# Patient Record
Sex: FEMALE | Race: WHITE
[De-identification: names, ages, dates, MRNs, and addresses within clinical notes are randomized per-mention and may not be internally consistent; named-entity substitution may affect disease eponyms.]

---

## 2018-07-29 ENCOUNTER — HOSPITAL ENCOUNTER (OUTPATIENT)
Dept: HOSPITAL 33 - ED | Age: 63
Setting detail: OBSERVATION
LOS: 1 days | Discharge: HOME | End: 2018-07-30
Attending: INTERNAL MEDICINE | Admitting: INTERNAL MEDICINE
Payer: MEDICARE

## 2018-07-29 DIAGNOSIS — J44.9: ICD-10-CM

## 2018-07-29 DIAGNOSIS — M19.90: ICD-10-CM

## 2018-07-29 DIAGNOSIS — Z79.899: ICD-10-CM

## 2018-07-29 DIAGNOSIS — E78.2: ICD-10-CM

## 2018-07-29 DIAGNOSIS — G45.9: Primary | ICD-10-CM

## 2018-07-29 DIAGNOSIS — K21.9: ICD-10-CM

## 2018-07-29 DIAGNOSIS — I10: ICD-10-CM

## 2018-07-29 DIAGNOSIS — J45.909: ICD-10-CM

## 2018-07-29 DIAGNOSIS — E78.00: ICD-10-CM

## 2018-07-29 LAB
ALBUMIN SERPL-MCNC: 3.9 G/DL (ref 3.5–5)
ALP SERPL-CCNC: 164 U/L (ref 38–126)
ALT SERPL-CCNC: 13 U/L (ref 0–35)
ANION GAP SERPL CALC-SCNC: 12.9 MEQ/L (ref 5–15)
AST SERPL QL: 18 U/L (ref 14–36)
BASOPHILS # BLD AUTO: 0.01 10*3/UL (ref 0–0.4)
BASOPHILS NFR BLD AUTO: 0.1 % (ref 0–0.4)
BILIRUB BLD-MCNC: 0.5 MG/DL (ref 0.2–1.3)
BUN SERPL-MCNC: 16 MG/DL (ref 7–17)
CALCIUM SPEC-MCNC: 9.1 MG/DL (ref 8.4–10.2)
CHLORIDE SERPL-SCNC: 97 MMOL/L (ref 98–107)
CO2 SERPL-SCNC: 27 MMOL/L (ref 22–30)
CREAT SERPL-MCNC: 1.19 MG/DL (ref 0.52–1.04)
EOSINOPHIL # BLD AUTO: 0.26 10*3/UL (ref 0–0.5)
GLUCOSE SERPL-MCNC: 110 MG/DL (ref 74–106)
GRANULOCYTES # BLD AUTO: 3.78 10*3/UL (ref 1.4–6.9)
HCT VFR BLD AUTO: 31.5 % (ref 35–47)
HGB BLD-MCNC: 10.4 GM/DL (ref 12–16)
INR PPP: 0.99 (ref 0.8–3)
LYMPHOCYTES # SPEC AUTO: 2.43 10*3/UL (ref 1–4.6)
MCH RBC QN AUTO: 28.7 PG (ref 26–32)
MCHC RBC AUTO-ENTMCNC: 33 G/DL (ref 32–36)
MONOCYTES # BLD AUTO: 0.53 10*3/UL (ref 0–1.3)
NEUTROPHILS NFR BLD AUTO: 53.9 % (ref 36–66)
PLATELET # BLD AUTO: 254 K/MM3 (ref 150–450)
POTASSIUM SERPLBLD-SCNC: 4 MMOL/L (ref 3.5–5.1)
PROT SERPL-MCNC: 6.5 G/DL (ref 6.3–8.2)
RBC # BLD AUTO: 3.62 M/MM3 (ref 4.1–5.4)
SODIUM SERPL-SCNC: 133 MMOL/L (ref 137–145)
WBC # BLD AUTO: 7 K/MM3 (ref 4–10.5)

## 2018-07-29 PROCEDURE — 93880 EXTRACRANIAL BILAT STUDY: CPT

## 2018-07-29 PROCEDURE — 70551 MRI BRAIN STEM W/O DYE: CPT

## 2018-07-29 PROCEDURE — G0378 HOSPITAL OBSERVATION PER HR: HCPCS

## 2018-07-29 PROCEDURE — 84484 ASSAY OF TROPONIN QUANT: CPT

## 2018-07-29 PROCEDURE — 80053 COMPREHEN METABOLIC PANEL: CPT

## 2018-07-29 PROCEDURE — 36415 COLL VENOUS BLD VENIPUNCTURE: CPT

## 2018-07-29 PROCEDURE — 99285 EMERGENCY DEPT VISIT HI MDM: CPT

## 2018-07-29 PROCEDURE — 94760 N-INVAS EAR/PLS OXIMETRY 1: CPT

## 2018-07-29 PROCEDURE — 71045 X-RAY EXAM CHEST 1 VIEW: CPT

## 2018-07-29 PROCEDURE — 93306 TTE W/DOPPLER COMPLETE: CPT

## 2018-07-29 PROCEDURE — 85610 PROTHROMBIN TIME: CPT

## 2018-07-29 PROCEDURE — 93005 ELECTROCARDIOGRAM TRACING: CPT

## 2018-07-29 PROCEDURE — 85025 COMPLETE CBC W/AUTO DIFF WBC: CPT

## 2018-07-29 PROCEDURE — 36000 PLACE NEEDLE IN VEIN: CPT

## 2018-07-29 PROCEDURE — 70450 CT HEAD/BRAIN W/O DYE: CPT

## 2018-07-29 RX ADMIN — GABAPENTIN SCH MG: 300 CAPSULE ORAL at 22:12

## 2018-07-29 RX ADMIN — RANOLAZINE SCH MG: 500 TABLET, FILM COATED, EXTENDED RELEASE ORAL at 23:57

## 2018-07-29 NOTE — XRAY
Indication: Dizziness.



Multiple contiguous axial images obtained through the head without contrast.



Comparison: None



Normal appearing brain parenchyma, ventricles, and bony calvarium.  Visualized

paranasal sinuses and mastoid air cells are clear.



Impression: Normal CT head without contrast exam.



Comment: Preliminary interpretation was made by [facility].  No discrepancy.



CTDI 67.80

## 2018-07-29 NOTE — XRAY
Indication: Cough.



Comparison: None



Portable apical lordotic chest demonstrates right hemidiaphragm elevation with

adjacent infiltrate/atelectasis.  Remaining lungs clear.  Heart is not

enlarged.  Bony thorax intact with mild spinal degenerative changes.



Impression: Right hemidiaphragm elevation with adjacent

infiltrate/atelectasis.  Correlate clinically.

## 2018-07-29 NOTE — ERPHSYRPT
- History of Present Illness


Time Seen by Provider: 07/29/18 14:30


Source: patient


Exam Limitations: clinical condition


Patient Subjective Stated Complaint: dizziness while visiting at the long-term.  

states has had a hx of dizziness x 1 year.. did not eat tioday.


Triage Nursing Assessment: alert and oriented with no dizziness at this time.. 

has had episodes of dizziness x 1 year.  states has had visual problems 

intermittently x 1 year.  MORRIS  able to ambulate to BR on arrival to ER with no 

difficulty and steady gait.   = strong.


Physician History: 





PATIENT WITH A HISTORY OF HYPERTENSION COMPLAINS OF ACUTE ONSET OF DYSPHASIA 

AND DYSARTHRIA WHILE VISITING FAMILY MEMBER, DURATION OF 20 MINUTES.  HAD 

PREVIOUS EPISODE IN THE PAST, EVALUATED BY NEUROLOGIST.  STATES SYMPTOMS 

RESOLVED PRIOR TO ARRIVAL. DENIES HEADACHE, BLURRED VISION,  FOCAL NUMBNESS, 

TINGLING OR WEAKNESS IN EXTREMITIES.


Timing/Duration: today (20 MINUTES)


Severity: moderate


Character of Deficits: none


Deficits: no difficulties


Baseline/Normal Cognition: alert oriented x 3


Current Cognition: alert oriented x 3


Baseline Gait: walks w/o assistance


Associated Symptoms: slurred speech


Allergies/Adverse Reactions: 








Penicillins Allergy (Verified 07/29/18 15:13)


 


sulfamethoxazole [From Bactrim] Allergy (Verified 07/29/18 15:14)


 


trimethoprim [From Bactrim] Allergy (Verified 07/29/18 15:14)


 


codeine Adverse Reaction (Verified 07/29/18 15:14)


 


ibuprofen Adverse Reaction (Verified 07/29/18 15:14)


 





Home Medications: 








Atorvastatin Calcium [Lipitor] 40 mg PO DAILY 07/29/18 [History]


Cyclobenzaprine HCl [Flexeril] 10 mg PO TIDPRN PRN 07/29/18 [History]


Gabapentin [Neurontin] 600 mg PO TID 07/29/18 [History]


Lisinopril/Hydrochlorothiazide [Lisinopril-Hctz 20-12.5 mg Tab] 1 each PO DAILY 

07/29/18 [History]


Metformin HCl [Metformin HCl ER] 500 mg PO DAILY 07/29/18 [History]


Nebivolol HCl 5 MG*** [Bystolic 5 MG***] 5 mg PO DAILY 07/29/18 [History]


PANTOPRAZOLE 40 mg Tablet*** [Protonix 40MG Tablet***] 40 mg PO QAM 07/29/18 [

History]


Ranolazine 500 MG*** [Ranexa 500 MG***] 500 mg PO BID 07/29/18 [History]


Tramadol HCl 50 mg*** [Ultram 50 mg***] 100 mg PO Q8H PRN PRN 07/29/18 [History]





Immunizations Up to Date:  (unknown)





- Review of Systems


Constitutional: No Fever, No Chills


Eyes: No Symptoms


Ears, Nose, & Throat: No Symptoms


Respiratory: No Cough, No Dyspnea


Cardiac: No Chest Pain, No Edema, No Syncope


Abdominal/Gastrointestinal: No Abdominal Pain, No Nausea, No Vomiting, No 

Diarrhea


Genitourinary Symptoms: No Dysuria


Musculoskeletal: No Symptoms, No Back Pain, No Neck Pain


Skin: No Rash


Neurological: Speech Changes, No Dizziness, No Focal Weakness, No Sensory 

Changes


Psychological: No Symptoms


Endocrine: No Symptoms


All Other Systems: Reviewed and Negative





- Past Medical History


Pertinent Past Medical History: Yes


Cardiac History: High Cholesterol, Hypertension


Respiratory History: Asthma, COPD


Musculoskeletal History: Osteoarthritis


GI Medical History: GERD


Other Medical History: fibromyalgia





- Past Surgical History


Past Surgical History: Yes


Other Surgical History: states has had20 but unsure of exactly what





- Social History


Smoking Status: Former smoker


Exposure to second hand smoke: No


Drug Use: none


Patient Lives Alone: No





- Female History


Hx Pregnant Now: No





- Nursing Vital Signs


Nursing Vital Signs: 





 Initial Vital Signs











Temperature  97.8 F   07/29/18 14:22


 


Pulse Rate  47 L  07/29/18 14:22


 


Respiratory Rate  18   07/29/18 14:22


 


Blood Pressure  144/72   07/29/18 14:22


 


O2 Sat by Pulse Oximetry  98   07/29/18 14:22








 Pain Scale











Pain Intensity                 0

















- [place] Coma Scale


Best Eye Response ([place]): (4) open spontaneously


Best Verbal Response ([place]): (5) oriented


Best Motor Response ([place]): (6) obeys commands


[place] Total: 15





- Physical Exam


General Appearance: no apparent distress, alert


Eye Exam: bilateral eye: normal inspection, PERRL, EOMI


Ears, Nose, Throat Exam: normal ENT inspection, moist mucous membranes


Neck Exam: normal inspection, non-tender, supple


Respiratory: normal breath sounds, lungs clear, airway intact, No respiratory 

distress


Cardiovascular: regular rate/rhythm, normal heart sounds, No edema


Gastrointestinal: soft, No tenderness, No distention


Back Exam: normal inspection


Extremity Exam: normal inspection, No pedal edema


Peripheral Pulses: carotid (R): 2+, carotid (L): 2+, femoral (R): 2+, femoral (L

): 2+, dorsalis-pedis (R): 2+, dorsalis-pedis (L): 2+


Mental Status: alert, oriented x 3


CNs Exam: normal hearing, normal speech, tongue midline


Coordination/Gait: normal finger to nose, normal gait


DTR: bicep (R): 2+, bicep (L): 2+, tricep (R): 2+, tricep (L): 2+, knee (R): 2+

, knee (L): 2+, ankle (R): 2+, ankle (L): 2+


Skin Exam: normal color, warm, dry, No rash


SpO2: 100


Oxygen Delivery: Nasal Cannula





- Course


EKG Interpreted by Me: RATE, Sinus Rhythm, Sinus Ankush





- Radiology Exams


  ** Chest


X-ray Interpretation: Interpreted by me, Reviewed by me (ELEVATION RIGHT 

HEMIDIAPHRAM)





- CT Exams


  ** Head


CT Interpretation: No/Intracranial Hemorrhag


Ordered Tests: 





 Active Orders 24 hr











 Category Date Time Status


 


 EKG-ER Only STAT Care  07/29/18 15:02 Active


 


 IV Insertion STAT Care  07/29/18 15:02 Active


 


 Oxygen-ED Only NASAL CANNULA 2 lpm Care  07/29/18 15:02 Active


 


 CHEST 1 VIEW (PORTABLE) Stat Exams  07/29/18 16:02 Taken


 


 HEAD WITHOUT CONTRAST [CT] Stat Exams  07/29/18 15:04 Taken


 


 CBC W DIFF Stat Lab  07/29/18 14:30 Completed


 


 CMP Stat Lab  07/29/18 14:30 Completed


 


 PROTIME WITH INR Stat Lab  07/29/18 14:30 Completed


 


 TROPONIN Q3H Lab  07/29/18 14:30 Completed


 


 TROPONIN Q3H Lab  07/30/18 00:15 Ordered


 


 TROPONIN Q3H Lab  07/30/18 03:15 Ordered








Medication Summary











Generic Name Dose Route Start Last Admin





  Trade Name Freq  PRN Reason Stop Dose Admin


 


Sodium Chloride  1,000 mls @ 50 mls/hr  07/29/18 15:15  07/29/18 15:22





  Sodium Chloride 0.9% 1000 Ml  IV  08/28/18 15:14  50 mls/hr





  .Q20H DARÍO   Administration





     





     





     





     











Lab/Rad Data: 





 Laboratory Result Diagrams





 07/29/18 14:30 





 07/29/18 14:30 





 Laboratory Results











  07/29/18 07/29/18 07/29/18 Range/Units





  14:30 14:30 14:30 


 


WBC     (4.0-10.5)  K/mm3


 


RBC     (4.1-5.4)  M/mm3


 


Hgb     (12.0-16.0)  gm/dl


 


Hct     (35-47)  %


 


MCV     ()  fl


 


MCH     (26-32)  pg


 


MCHC     (32-36)  g/dl


 


RDW     (11.5-14.0)  %


 


Plt Count     (150-450)  K/mm3


 


MPV     (6-9.5)  fl


 


Gran %     (36.0-66.0)  %


 


Eos # (Auto)     (0-0.5)  


 


Absolute Lymphs (auto)     (1.0-4.6)  


 


Absolute Monos (auto)     (0.0-1.3)  


 


Lymphocytes %     (24.0-44.0)  %


 


Monocytes %     (0.0-12.0)  %


 


Eosinophils %     (0.00-5.0)  %


 


Basophils %     (0.0-0.4)  %


 


Absolute Granulocytes     (1.4-6.9)  


 


Basophils #     (0-0.4)  


 


PT   11.5   (9.95-12.35)  SECONDS


 


INR   0.99   (0.8-3.0)  


 


Sodium    133 L  (137-145)  mmol/L


 


Potassium    4.0  (3.5-5.1)  mmol/L


 


Chloride    97 L  ()  mmol/L


 


Carbon Dioxide    27  (22-30)  mmol/L


 


Anion Gap    12.9  (5-15)  MEQ/L


 


BUN    16  (7-17)  mg/dL


 


Creatinine    1.19 H  (0.52-1.04)  mg/dL


 


Estimated GFR    48.9  ML/MIN


 


Glucose    110 H  ()  mg/dL


 


Calcium    9.1  (8.4-10.2)  mg/dL


 


Total Bilirubin    0.50  (0.2-1.3)  mg/dL


 


AST    18  (14-36)  U/L


 


ALT    13  (0-35)  U/L


 


Alkaline Phosphatase    164 H  ()  U/L


 


Troponin I  < 0.012    (0.000-0.034)  ng/mL


 


Serum Total Protein    6.5  (6.3-8.2)  g/dL


 


Albumin    3.9  (3.5-5.0)  g/dL














  07/29/18 Range/Units





  14:30 


 


WBC  7.0  (4.0-10.5)  K/mm3


 


RBC  3.62 L  (4.1-5.4)  M/mm3


 


Hgb  10.4 L  (12.0-16.0)  gm/dl


 


Hct  31.5 L  (35-47)  %


 


MCV  87.0  ()  fl


 


MCH  28.7  (26-32)  pg


 


MCHC  33.0  (32-36)  g/dl


 


RDW  14.7 H  (11.5-14.0)  %


 


Plt Count  254  (150-450)  K/mm3


 


MPV  10.8 H  (6-9.5)  fl


 


Gran %  53.9  (36.0-66.0)  %


 


Eos # (Auto)  0.26  (0-0.5)  


 


Absolute Lymphs (auto)  2.43  (1.0-4.6)  


 


Absolute Monos (auto)  0.53  (0.0-1.3)  


 


Lymphocytes %  34.7  (24.0-44.0)  %


 


Monocytes %  7.6  (0.0-12.0)  %


 


Eosinophils %  3.7  (0.00-5.0)  %


 


Basophils %  0.1  (0.0-0.4)  %


 


Absolute Granulocytes  3.78  (1.4-6.9)  


 


Basophils #  0.01  (0-0.4)  


 


PT   (9.95-12.35)  SECONDS


 


INR   (0.8-3.0)  


 


Sodium   (137-145)  mmol/L


 


Potassium   (3.5-5.1)  mmol/L


 


Chloride   ()  mmol/L


 


Carbon Dioxide   (22-30)  mmol/L


 


Anion Gap   (5-15)  MEQ/L


 


BUN   (7-17)  mg/dL


 


Creatinine   (0.52-1.04)  mg/dL


 


Estimated GFR   ML/MIN


 


Glucose   ()  mg/dL


 


Calcium   (8.4-10.2)  mg/dL


 


Total Bilirubin   (0.2-1.3)  mg/dL


 


AST   (14-36)  U/L


 


ALT   (0-35)  U/L


 


Alkaline Phosphatase   ()  U/L


 


Troponin I   (0.000-0.034)  ng/mL


 


Serum Total Protein   (6.3-8.2)  g/dL


 


Albumin   (3.5-5.0)  g/dL














- Progress


Progress: unchanged


Discussed with Dr.: MURALI Coombs (DISCUSSED WITH DR ROMMEL BOX AT [id number] FOR 

OBSERVATIION)





- Departure


Time of Disposition: 18:55


Departure Disposition: Observation


Clinical Impression: 


 TRANSIENT ISCHEMIC ATTACK





Condition: Stable


Critical Care Time: No


Referrals: 


Provider,Unknown [Primary Care Provider] -

## 2018-07-30 VITALS — DIASTOLIC BLOOD PRESSURE: 63 MMHG | OXYGEN SATURATION: 94 % | SYSTOLIC BLOOD PRESSURE: 144 MMHG | HEART RATE: 53 BPM

## 2018-07-30 RX ADMIN — GABAPENTIN SCH MG: 300 CAPSULE ORAL at 09:34

## 2018-07-30 RX ADMIN — RANOLAZINE SCH MG: 500 TABLET, FILM COATED, EXTENDED RELEASE ORAL at 09:34

## 2018-07-30 NOTE — PCM.SSS
History of Present Illness





- Chief Complaint


Chief Complaint: c/o dizziness and difficulty in speech for 1 days


History of Present Illness: 


 is a 63 year old female.came to ER with c/o dizziness and difficulty in 

speech in AM. She has not eaten anything today AM and she thought thats  what 

bring this episode.








- Review of Systems


Constitutional: No Fever, No Chills


Eyes: No Symptoms


Ears, Nose, & Throat: No Symptoms


Respiratory: No Cough, No Short Of Breath


Cardiac: No Chest Pain, No Edema, No Syncope


Abdominal/Gastrointestinal: No Abdominal Pain, No Nausea, No Vomiting, No 

Diarrhea


Genitourinary Symptoms: No Dysuria


Musculoskeletal: No Back Pain, No Neck Pain


Skin: No Rash


Neurological: No Dizziness, No Focal Weakness, No Sensory Changes


Psychological: No Symptoms


Endocrine: No Symptoms


Hematologic/Lymphatic: No Symptoms


Immunological/Allergic: No Symptoms





Medications & Allergies


Home Medications: 


 Home Medication List





Atorvastatin Calcium [Lipitor] 40 mg PO QHS 07/29/18 [History Confirmed 07/29/18

]


Cholecalciferol (Vitamin D3) [Vitamin D3] 2,000 unit PO QHS 07/29/18 [History 

Confirmed 07/29/18]


Cyclobenzaprine HCl [Flexeril] 10 mg PO TIDPRN PRN 07/29/18 [History Confirmed 

07/29/18]


Fish Oil/Borage/Flax/Om3,6,9 1 [Flaxseed-Fish-Borage Oil Sftgl] 400 mg PO QHS 07 /29/18 [History Confirmed 07/29/18]


Gabapentin [Neurontin] 600 mg PO TID 07/29/18 [History Confirmed 07/29/18]


Lisinopril/Hydrochlorothiazide [Lisinopril-Hctz 20-12.5 mg Tab] 1 each PO DAILY 

07/29/18 [History Confirmed 07/29/18]


Naproxen Sodium 220 mg*** [Aleve 220 MG***] 440 mg PO QHS PRN 07/29/18 [History 

Confirmed 07/29/18]


Nebivolol HCl 5 MG*** [Bystolic 5 MG***] 5 mg PO DAILY 07/29/18 [History 

Confirmed 07/29/18]


PANTOPRAZOLE 40 mg Tablet*** [Protonix 40MG Tablet***] 40 mg PO QAM 07/29/18 [

History Confirmed 07/29/18]


Ranolazine 500 MG*** [Ranexa 500 MG***] 1,000 mg PO BID 07/29/18 [History 

Confirmed 07/29/18]


Tramadol HCl 50 mg*** [Ultram 50 mg***] 100 mg PO Q8H PRN PRN 07/29/18 [History 

Confirmed 07/29/18]








Allergies/Adverse Reactions: 


 Allergies











Allergy/AdvReac Type Severity Reaction Status Date / Time


 


sulfamethoxazole Allergy   Verified 07/29/18 15:14





[From Bactrim]     


 


trimethoprim [From Bactrim] Allergy   Verified 07/29/18 15:14


 


codeine AdvReac   Verified 07/29/18 15:14


 


ibuprofen AdvReac   Verified 07/29/18 15:14


 


Penicillins AdvReac   Verified 07/29/18 20:21














- Past Medical History


Past Medical History: Yes


Neurological History: No Pertinent History


ENT History: No Pertinent History


Cardiac History: High Cholesterol, Hypertension


Respiratory History: Asthma, COPD


Endocrine Medical History: No Pertinent History


Musculoskelatal History: Fibromyalgia, Osteoarthritis


GI Medical History: GERD


 History: No Pertinent History


Pyscho-Social History: No Pertinent History


Reproductive Disorders: No Pertinent History


Comment: fibromyalgia





- Female History


Are you pregnant now?: No





- Past Surgical History


Past Surgical History: Yes


Neuro Surgical History: No Pertinent History


Cardiac History: No Pertinent History


Respiratory Surgery: No Pertinent History


GI Surgical History: Appendectomy, Cholecystectomy


Genitourinary Surgical Hx: No Pertinent History


Musculskeletal Surgical Hx: Orthopedic Surgery


Female Surgical History: Tubal Ligation


Other Surgical History: states has had20 but unsure of exactly what





- Social History


Smoking Status: Former smoker


Exposure to second hand smoke: No


Alcohol: None


Drug Use: none





- Physical Exam


Vital Signs: 


 Vital Signs - 24 hr











  Temp Pulse Resp BP Pulse Ox


 


 07/30/18 11:25  98.6 F  53 L  18  144/63  94 L


 


 07/30/18 10:20      97


 


 07/30/18 07:21  98.8 F  49 L  18  110/49  94 L


 


 07/30/18 04:07  98.1 F  46 L  18  121/59  100


 


 07/30/18 04:00    18  


 


 07/30/18 00:03  98.2 F  54 L  22  150/63  99


 


 07/30/18 00:00    22  


 


 07/29/18 21:40   56 L  20   99


 


 07/29/18 20:29  98.3 F  59 L  16  189/79  100


 


 07/29/18 18:48      100


 


 07/29/18 18:43   48 L  18  164/58  97


 


 07/29/18 17:40   50 L  18  157/77  97


 


 07/29/18 16:24   52 L  18  148/58  100


 


 07/29/18 15:10   50 L  16  157/72  97


 


 07/29/18 15:08   53 L  18  154/57  97


 


 07/29/18 14:22  97.8 F  47 L  18  144/72  98








 Oxygen-Last 24 hours











O2 Percentage                  2 Liters = 28%


 


O2 Percentage                  2 Liters = 28%


 


O2 Percentage                  2 Liters = 28%


 


O2 Percentage                  2 Liters = 28%


 


Oxygen Flowrate (L/min)-RT     2














General Appearance: no apparent distress, alert


Neurologic Exam: alert, oriented x 3, cooperative, normal mood/affect, nml 

cerebellar function, nml station & gait, sensation nml, No motor deficits


Eye Exam: PERRL/EOMI, eyes nml inspection


Ears, Nose, Throat Exam: normal ENT inspection, TMs normal, pharynx normal, 

moist mucous membranes


Neck Exam: normal inspection, non-tender, supple, full range of motion


Respiratory Exam: normal breath sounds, lungs clear, No respiratory distress


Cardiovascular Exam: regular rate/rhythm, normal heart sounds, normal 

peripheral pulses


Gastrointestinal/Abdomen Exam: soft, normal bowel sounds, No tenderness, No mass


Back Exam: normal inspection, normal range of motion, No CVA tenderness, No 

vertebral tenderness


Extremity Exam: normal inspection, normal range of motion, pelvis stable


Skin Exam: normal color, warm, dry, No rash


Lymphatic Exam: No adenopathy





Results





- Labs


Lab/Micro Results: 


 Lab Results-Last 24 Hours











  07/29/18 07/29/18 07/29/18 Range/Units





  14:30 14:30 14:30 


 


WBC  7.0    (4.0-10.5)  K/mm3


 


RBC  3.62 L    (4.1-5.4)  M/mm3


 


Hgb  10.4 L    (12.0-16.0)  gm/dl


 


Hct  31.5 L    (35-47)  %


 


MCV  87.0    ()  fl


 


MCH  28.7    (26-32)  pg


 


MCHC  33.0    (32-36)  g/dl


 


RDW  14.7 H    (11.5-14.0)  %


 


Plt Count  254    (150-450)  K/mm3


 


MPV  10.8 H    (6-9.5)  fl


 


Gran %  53.9    (36.0-66.0)  %


 


Eos # (Auto)  0.26    (0-0.5)  


 


Absolute Lymphs (auto)  2.43    (1.0-4.6)  


 


Absolute Monos (auto)  0.53    (0.0-1.3)  


 


Lymphocytes %  34.7    (24.0-44.0)  %


 


Monocytes %  7.6    (0.0-12.0)  %


 


Eosinophils %  3.7    (0.00-5.0)  %


 


Basophils %  0.1    (0.0-0.4)  %


 


Absolute Granulocytes  3.78    (1.4-6.9)  


 


Basophils #  0.01    (0-0.4)  


 


PT    11.5  (9.95-12.35)  SECONDS


 


INR    0.99  (0.8-3.0)  


 


Sodium   133 L   (137-145)  mmol/L


 


Potassium   4.0   (3.5-5.1)  mmol/L


 


Chloride   97 L   ()  mmol/L


 


Carbon Dioxide   27   (22-30)  mmol/L


 


Anion Gap   12.9   (5-15)  MEQ/L


 


BUN   16   (7-17)  mg/dL


 


Creatinine   1.19 H   (0.52-1.04)  mg/dL


 


Estimated GFR   48.9   ML/MIN


 


Glucose   110 H   ()  mg/dL


 


Calcium   9.1   (8.4-10.2)  mg/dL


 


Total Bilirubin   0.50   (0.2-1.3)  mg/dL


 


AST   18   (14-36)  U/L


 


ALT   13   (0-35)  U/L


 


Alkaline Phosphatase   164 H   ()  U/L


 


Troponin I     (0.000-0.034)  ng/mL


 


Serum Total Protein   6.5   (6.3-8.2)  g/dL


 


Albumin   3.9   (3.5-5.0)  g/dL














  07/29/18 Range/Units





  14:30 


 


WBC   (4.0-10.5)  K/mm3


 


RBC   (4.1-5.4)  M/mm3


 


Hgb   (12.0-16.0)  gm/dl


 


Hct   (35-47)  %


 


MCV   ()  fl


 


MCH   (26-32)  pg


 


MCHC   (32-36)  g/dl


 


RDW   (11.5-14.0)  %


 


Plt Count   (150-450)  K/mm3


 


MPV   (6-9.5)  fl


 


Gran %   (36.0-66.0)  %


 


Eos # (Auto)   (0-0.5)  


 


Absolute Lymphs (auto)   (1.0-4.6)  


 


Absolute Monos (auto)   (0.0-1.3)  


 


Lymphocytes %   (24.0-44.0)  %


 


Monocytes %   (0.0-12.0)  %


 


Eosinophils %   (0.00-5.0)  %


 


Basophils %   (0.0-0.4)  %


 


Absolute Granulocytes   (1.4-6.9)  


 


Basophils #   (0-0.4)  


 


PT   (9.95-12.35)  SECONDS


 


INR   (0.8-3.0)  


 


Sodium   (137-145)  mmol/L


 


Potassium   (3.5-5.1)  mmol/L


 


Chloride   ()  mmol/L


 


Carbon Dioxide   (22-30)  mmol/L


 


Anion Gap   (5-15)  MEQ/L


 


BUN   (7-17)  mg/dL


 


Creatinine   (0.52-1.04)  mg/dL


 


Estimated GFR   ML/MIN


 


Glucose   ()  mg/dL


 


Calcium   (8.4-10.2)  mg/dL


 


Total Bilirubin   (0.2-1.3)  mg/dL


 


AST   (14-36)  U/L


 


ALT   (0-35)  U/L


 


Alkaline Phosphatase   ()  U/L


 


Troponin I  < 0.012  (0.000-0.034)  ng/mL


 


Serum Total Protein   (6.3-8.2)  g/dL


 


Albumin   (3.5-5.0)  g/dL














- Radiology Impressions


Radiology Exams & Impressions: 


 Radiology Procedures











 Category Date Time Status


 


 CAROTID BILATERAL [US] Routine Exams  07/30/18 07:00 Completed


 


 CHEST 1 VIEW (PORTABLE) Stat Exams  07/29/18 16:02 Completed


 


 ECHO W/2D AND DOPPLER [US] Routine Exams  07/30/18 07:00 Taken


 


 HEAD WITHOUT CONTRAST [CT] Stat Exams  07/29/18 15:04 Completed


 


 MRI BRAIN W/O CONTRAST [MRI] Routine Exams  07/30/18 07:00 Completed














Assessment/Plan


(1) TIA (transient ischemic attack)


Current Visit: Yes   Status: Acute   Code(s): G45.9 - TRANSIENT CEREBRAL 

ISCHEMIC ATTACK, UNSPECIFIED   





(2) HTN (hypertension)


Current Visit: Yes   Status: Acute   


Qualifiers: 


   Hypertension type: essential hypertension   Qualified Code(s): I10 - 

Essential (primary) hypertension   


Code(s): I10 - ESSENTIAL (PRIMARY) HYPERTENSION   





(3) Hyperlipidemia


Current Visit: Yes   Status: Acute   


Qualifiers: 


   Hyperlipidemia type: mixed hyperlipidemia   Qualified Code(s): E78.2 - Mixed 

hyperlipidemia   


Code(s): E78.5 - HYPERLIPIDEMIA, UNSPECIFIED   





Hospital Summary





- Hospital Course


Hospital Course: 





 Chief Complaint





Diagnosis                        TRANSIENT ISCHEMIC ATTACK





 Allergies











Allergy/AdvReac Type Severity Reaction Status Date / Time


 


sulfamethoxazole Allergy   Verified 07/29/18 15:14





[From Bactrim]     


 


trimethoprim [From Bactrim] Allergy   Verified 07/29/18 15:14


 


codeine AdvReac   Verified 07/29/18 15:14


 


ibuprofen AdvReac   Verified 07/29/18 15:14


 


Penicillins AdvReac   Verified 07/29/18 20:21








 Vital Signs (Last 24 hours)











  Temp Pulse Resp BP Pulse Ox


 


 07/30/18 11:25  98.6 F  53 L  18  144/63  94 L


 


 07/30/18 10:20      97


 


 07/30/18 07:21  98.8 F  49 L  18  110/49  94 L


 


 07/30/18 04:07  98.1 F  46 L  18  121/59  100


 


 07/30/18 04:00    18  


 


 07/30/18 00:03  98.2 F  54 L  22  150/63  99


 


 07/30/18 00:00    22  


 


 07/29/18 21:40   56 L  20   99


 


 07/29/18 20:29  98.3 F  59 L  16  189/79  100


 


 07/29/18 18:48      100


 


 07/29/18 18:43   48 L  18  164/58  97


 


 07/29/18 17:40   50 L  18  157/77  97


 


 07/29/18 16:24   52 L  18  148/58  100


 


 07/29/18 15:10   50 L  16  157/72  97


 


 07/29/18 15:08   53 L  18  154/57  97


 


 07/29/18 14:22  97.8 F  47 L  18  144/72  98








 Home Medications











 Medication  Instructions  Recorded  Confirmed  Last Taken  Type


 


Atorvastatin Calcium [Lipitor] 40 mg PO QHS 07/29/18 07/29/18 07/29/18 History


 


Cholecalciferol (Vitamin D3) 2,000 unit PO QHS 07/29/18 07/29/18 07/28/18 

History





[Vitamin D3]     


 


Cyclobenzaprine HCl [Flexeril] 10 mg PO TIDPRN PRN 07/29/18 07/29/18 07/29/18 

History


 


Fish Oil/Borage/Flax/Om3,6,9 1 400 mg PO QHS 07/29/18 07/29/18 07/28/18 History





[Flaxseed-Fish-Borage Oil Sftgl]     


 


Gabapentin [Neurontin] 600 mg PO TID 07/29/18 07/29/18 07/29/18 History


 


Lisinopril/Hydrochlorothiazide 1 each PO DAILY 07/29/18 07/29/18 07/29/18 

History





[Lisinopril-Hctz 20-12.5 mg Tab]     


 


Naproxen Sodium 220 mg*** [Aleve 440 mg PO QHS PRN 07/29/18 07/29/18 07/18/18 

History





220 MG***]     


 


Nebivolol HCl 5 MG*** [Bystolic 5 5 mg PO DAILY 07/29/18 07/29/18 07/29/18 

History





MG***]     


 


PANTOPRAZOLE 40 mg Tablet*** 40 mg PO QAM 07/29/18 07/29/18 07/29/18 History





[Protonix 40MG Tablet***]     


 


Ranolazine 500 MG*** [Ranexa 500 1,000 mg PO BID 07/29/18 07/29/18 07/29/18 

History





MG***]     


 


Tramadol HCl 50 mg*** [Ultram 50 100 mg PO Q8H PRN PRN 07/29/18 07/29/18 07/29/ 18 History





mg***]     








 Current Medications











Generic Name Dose Route Start Last Admin





  Trade Name Freq  PRN Reason Stop Dose Admin


 


Acetaminophen  650 mg  07/29/18 18:50  





  Tylenol 325 Mg***  PO  08/28/18 18:49  





  Q4H PRN PRN   





  PAIN AND/OR FEVER   





     





     





     


 


Aspirin  81 mg  07/30/18 10:00  07/30/18 09:33





  Ecotrin 81 Mg***  PO  08/29/18 09:59  81 mg





  DAILY DARÍO   Administration





     





     





     





     


 


Cholecalciferol  2,000 unit  07/29/18 23:05  07/29/18 23:57





  Vitamin D***  PO  08/28/18 23:04  2,000 unit





  QHS DARÍO   Administration





     





     





     





     


 


Lisinopril 20 mg/  0 mg  07/30/18 10:00  07/30/18 09:35





  Hydrochlorothiazide 12.5 mg  PO  08/29/18 09:59  32.5 mg





  DAILY DARÍO   Administration





     





     





     





     


 


Fish Oil  1,000 mg  07/29/18 23:06  07/29/18 23:57





  Fish Oil 1,000 Mg Capsule***  PO  08/28/18 23:05  1,000 mg





  QHS DARÍO   Administration





     





     





     





     


 


Gabapentin  600 mg  07/29/18 22:00  07/30/18 09:34





  Neurontin 300 Mg***  PO  08/28/18 21:59  600 mg





  TID DARÍO   Administration





     





     





     





     


 


Sodium Chloride  1,000 mls @ 20 mls/hr  07/29/18 15:15  07/29/18 15:22





  Sodium Chloride 0.9% 1000 Ml  IV  08/28/18 15:14  50 mls/hr





  .Q24H DARÍO   Administration





     





     





     





     


 


Nebivolol  5 mg  07/30/18 10:00  07/30/18 09:33





  Bystolic 5 Mg***  PO  08/29/18 09:59  5 mg





  DAILY DARÍO   Administration





     





     





     





     


 


Pantoprazole Sodium  40 mg  07/30/18 10:00  07/30/18 09:34





  Protonix 40mg Tablet***  PO  08/29/18 09:59  40 mg





  DAILY DARÍO   Administration





     





     





     





     


 


Ranolazine  1,000 mg  07/29/18 23:13  07/30/18 09:34





  Ranexa 500 Mg***  PO  08/28/18 23:12  1,000 mg





  BID DARÍO   Administration





     





     





     





     


 


Simvastatin  40 mg  07/29/18 23:56  07/29/18 23:57





  Zocor 20mg**  PO  08/28/18 23:55  40 mg





  HS DARÍO   Administration





     





     





     





     


 


Tramadol HCl  50 mg  07/29/18 18:55  07/29/18 22:55





  Ultram 50 Mg***  PO  08/28/18 18:54  50 mg





  QID PRN PRN   Administration





  PAIN   





     





     





     














Discontinued Medications














Generic Name Dose Route Start Last Admin





  Trade Name Freq  PRN Reason Stop Dose Admin


 


Atorvastatin Calcium  40 mg  07/29/18 23:04  





  Lipitor 40mg  PO  08/28/18 23:03  





  QHS DARÍO   





     





     





     





     


 


Lisinopril  20 mg  07/29/18 18:53  07/29/18 22:12





  Zestril 10 Mg***  PO  07/29/18 18:54  20 mg





  STAT STA   Administration





     





     





     





     


 


Lisinopril  Confirm  07/29/18 22:00  





  Zestril 10 Mg***  Administered  07/29/18 22:01  





  Dose   





  20 mg   





  .ROUTE   





  .STK-MED ONE   





     





     





     





     








 Intake & Output (Last 24 hours)











 07/28/18 07/29/18 07/30/18 07/31/18





 11:59 11:59 11:59 11:59


 


Intake Total   870 


 


Output Total   1950 


 


Balance   -1080 


 


Weight   76.7 kg 








 Laboratory Results (Last 24 hours)











  07/29/18 07/29/18 07/29/18





  14:30 14:30 14:30


 


WBC   


 


RBC   


 


Hgb   


 


Hct   


 


MCV   


 


MCH   


 


MCHC   


 


RDW   


 


Plt Count   


 


MPV   


 


Gran %   


 


Eos # (Auto)   


 


Absolute Lymphs (auto)   


 


Absolute Monos (auto)   


 


Lymphocytes %   


 


Monocytes %   


 


Eosinophils %   


 


Basophils %   


 


Absolute Granulocytes   


 


Basophils #   


 


PT   11.5 


 


INR   0.99 


 


Sodium    133 L


 


Potassium    4.0


 


Chloride    97 L


 


Carbon Dioxide    27


 


Anion Gap    12.9


 


BUN    16


 


Creatinine    1.19 H


 


Estimated GFR    48.9


 


Glucose    110 H


 


Calcium    9.1


 


Total Bilirubin    0.50


 


AST    18


 


ALT    13


 


Alkaline Phosphatase    164 H


 


Troponin I  < 0.012  


 


Serum Total Protein    6.5


 


Albumin    3.9














  07/29/18





  14:30


 


WBC  7.0


 


RBC  3.62 L


 


Hgb  10.4 L


 


Hct  31.5 L


 


MCV  87.0


 


MCH  28.7


 


MCHC  33.0


 


RDW  14.7 H


 


Plt Count  254


 


MPV  10.8 H


 


Gran %  53.9


 


Eos # (Auto)  0.26


 


Absolute Lymphs (auto)  2.43


 


Absolute Monos (auto)  0.53


 


Lymphocytes %  34.7


 


Monocytes %  7.6


 


Eosinophils %  3.7


 


Basophils %  0.1


 


Absolute Granulocytes  3.78


 


Basophils #  0.01


 


PT 


 


INR 


 


Sodium 


 


Potassium 


 


Chloride 


 


Carbon Dioxide 


 


Anion Gap 


 


BUN 


 


Creatinine 


 


Estimated GFR 


 


Glucose 


 


Calcium 


 


Total Bilirubin 


 


AST 


 


ALT 


 


Alkaline Phosphatase 


 


Troponin I 


 


Serum Total Protein 


 


Albumin 








 Orders (Last 24 hours)











 Category Date Time Status


 


 Up Ad Beverley ROUTINE Activity  07/29/18 18:51 Active


 


 Call Admit Doctor for Orders ON ADMISSION Care  07/29/18 18:51 Active


 


 Code Status Order ROUTINE Care  07/29/18 18:50 Active


 


 EKG-ER Only STAT Care  07/29/18 15:02 Active


 


 IV Care Q6H Care  07/29/18 18:50 Active


 


 IV Insertion STAT Care  07/29/18 15:02 Active


 


 Neuro Checks Q2H Care  07/30/18 00:00 Active


 


 Oxygen-ED Only NASAL CANNULA 2 lpm Care  07/29/18 15:02 Completed


 


 Place in Observation ROUTINE Care  07/29/18 18:50 Active


 


 SCD's [Sequential Compression Device] Q6H Care  07/29/18 23:24 Active


 


 Vital Signs Q4H Care  07/29/18 18:50 Active


 


 CAROTID BILATERAL [US] Routine Exams  07/30/18 07:00 Completed


 


 CHEST 1 VIEW (PORTABLE) Stat Exams  07/29/18 16:02 Completed


 


 ECHO W/2D AND DOPPLER [US] Routine Exams  07/30/18 07:00 Taken


 


 HEAD WITHOUT CONTRAST [CT] Stat Exams  07/29/18 15:04 Completed


 


 MRI BRAIN W/O CONTRAST [MRI] Routine Exams  07/30/18 07:00 Completed


 


 CBC W DIFF Stat Lab  07/29/18 14:30 Completed


 


 CMP Stat Lab  07/29/18 14:30 Completed


 


 PROTIME WITH INR Stat Lab  07/29/18 14:30 Completed


 


 TROPONIN Q3H Lab  07/29/18 14:30 Completed


 


 Acetaminophen 325 mg*** [Tylenol 325 mg***] Med  07/29/18 18:50 Active





 650 mg PO Q4H PRN PRN   


 


 Aspirin EC 81 mg*** [Ecotrin 81 mg***] Med  07/30/18 10:00 Active





 81 mg PO DAILY   


 


 Atorvastatin Calcium [Lipitor 40Mg] Med  07/29/18 23:04 Discontinued





 40 mg PO QHS   


 


 Cholecalciferol (Vitamin D3)** [Vitamin D***] Med  07/29/18 23:05 Active





 2,000 unit PO QHS   


 


 Gabapentin 300 mg*** [Neurontin 300 mg***] Med  07/29/18 22:00 Active





 600 mg PO TID   


 


 Lisinopril 10 mg*** [Zestril 10 MG***] Med  07/29/18 22:00 Discontinued





 20 mg .ROUTE .STK-MED ONE   


 


 Lisinopril 10 mg*** [Zestril 10 MG***] Med  07/29/18 18:53 Discontinued





 20 mg PO STAT STA   


 


 Lisinopril 20 mg*** [Zestril 20 MG***] 20 mg Med  07/30/18 10:00 Active





 Hydrochlorothiazide 25 mg*** [hydroDIURIL 25 MG***] 12.   





 5 mg   





 PO DAILY   


 


 NaCl 0.9% 1000 ml [Sodium Chloride 0.9% 1000 ML] 1,000 Med  07/29/18 15:15 

Active





 ml   





 IV 20 mls/hr   


 


 Nebivolol HCl 5 MG*** [Bystolic 5 MG***] Med  07/30/18 10:00 Active





 5 mg PO DAILY   


 


 Omega-3 Fatty Acids/Fish Oil** [Fish Oil 1,000 mg Med  07/29/18 23:06 Active





 Capsule***]   





 1,000 mg PO QHS   


 


 PANTOPRAZOLE 40 mg Tablet*** [Protonix 40MG Tablet***] Med  07/30/18 10:00 

Active





 40 mg PO DAILY   


 


 Ranolazine 500 MG*** [Ranexa 500 MG***] Med  07/29/18 23:13 Active





 1,000 mg PO BID   


 


 Simvastatin 20Mg** [Zocor 20Mg**] Med  07/29/18 23:56 Active





 40 mg PO HS   


 


 Tramadol HCl 50 mg*** [Ultram 50 mg***] Med  07/29/18 18:55 Active





 50 mg PO QID PRN PRN   


 


 OT Eval and Treat (MD Order) ROUTINE OT  07/30/18 08:00 Active


 


 PT Eval & Treat (MD Order) ROUTINE PT  07/30/18 08:00 Active


 


 Oxygen NASAL CANNULA 2 lpm RT  07/29/18 21:50 Completed


 


 RT Screen per Nursing Assess ONCE RT  07/29/18 21:06 Completed


 


 Speech Therapy Eval & Treat [ST Eval & Treat (MD Order) ST  07/30/18 08:00 

Completed





 ] .as ordered   








 Patient Care Notes (Last 24 hours)





07/30/18 00:42 Respiratory Note by Gaetano Silverio


WHEN I DID PT ASSESSMENT SHE STATED THAT SHE DOES NOT USE [facility] O2.  SHE STATED 

THAT SHE USED TO USE BREO BUT HAD STOPPED ABOUT A COUPLE MONTHS AGO WHEN SHE 

KEPT GETTING THRUSH.  SHE STATED SHE WAS WAITING UNTIL HER NEXT PULM APPT. TO 

REQUEST A DIFFERENT RX.  SHE ALSO USES A RESCUE INHALER BUT WAS UNSURE OF THE 

NAME AND STATED THAT SHE HASN'T HAD TO USE IN OVER A MONTH.  PT USES A BIPAP 

BUT UNAWARE OF HER SETTINGS.  SHE SAID THAT SHE DID NOT WANT ONE THIS EVENING, 

THAT SHE FELT SHE WOULD BE FINE WITHOUT IT.  SHE GETS HER SUPPLIES FROM A PLACE 

CALLED "[email or web address]" IN [place] FOR HER BIPAP.  SHE SEES JAMEL SEE WHO IS A 

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  





Initialized on 07/30/18 00:42 - END OF NOTE

















- Vitals & Intake/Output


Vital Signs: 


 Vital Signs











Temperature  98.6 F   07/30/18 11:25


 


Pulse Rate  53 L  07/30/18 11:25


 


Respiratory Rate  18   07/30/18 11:25


 


Blood Pressure  144/63   07/30/18 11:25


 


O2 Sat by Pulse Oximetry  94 L  07/30/18 11:25








 Oxygen-Last Documented











O2 Percentage                  2 Liters = 28%














Intake & Output: 


 Intake & Output











 07/28/18 07/29/18 07/30/18 07/31/18





 11:59 11:59 11:59 11:59


 


Intake Total   870 


 


Output Total   1950 


 


Balance   -1080 


 


Weight   76.7 kg 














- Lab


Result Diagrams: 


 07/29/18 14:30





 07/29/18 14:30


Lab Results-Last 24 Hrs: 


 Lab Results-Last 24 Hours











  07/29/18 07/29/18 07/29/18 Range/Units





  14:30 14:30 14:30 


 


WBC  7.0    (4.0-10.5)  K/mm3


 


RBC  3.62 L    (4.1-5.4)  M/mm3


 


Hgb  10.4 L    (12.0-16.0)  gm/dl


 


Hct  31.5 L    (35-47)  %


 


MCV  87.0    ()  fl


 


MCH  28.7    (26-32)  pg


 


MCHC  33.0    (32-36)  g/dl


 


RDW  14.7 H    (11.5-14.0)  %


 


Plt Count  254    (150-450)  K/mm3


 


MPV  10.8 H    (6-9.5)  fl


 


Gran %  53.9    (36.0-66.0)  %


 


Eos # (Auto)  0.26    (0-0.5)  


 


Absolute Lymphs (auto)  2.43    (1.0-4.6)  


 


Absolute Monos (auto)  0.53    (0.0-1.3)  


 


Lymphocytes %  34.7    (24.0-44.0)  %


 


Monocytes %  7.6    (0.0-12.0)  %


 


Eosinophils %  3.7    (0.00-5.0)  %


 


Basophils %  0.1    (0.0-0.4)  %


 


Absolute Granulocytes  3.78    (1.4-6.9)  


 


Basophils #  0.01    (0-0.4)  


 


PT    11.5  (9.95-12.35)  SECONDS


 


INR    0.99  (0.8-3.0)  


 


Sodium   133 L   (137-145)  mmol/L


 


Potassium   4.0   (3.5-5.1)  mmol/L


 


Chloride   97 L   ()  mmol/L


 


Carbon Dioxide   27   (22-30)  mmol/L


 


Anion Gap   12.9   (5-15)  MEQ/L


 


BUN   16   (7-17)  mg/dL


 


Creatinine   1.19 H   (0.52-1.04)  mg/dL


 


Estimated GFR   48.9   ML/MIN


 


Glucose   110 H   ()  mg/dL


 


Calcium   9.1   (8.4-10.2)  mg/dL


 


Total Bilirubin   0.50   (0.2-1.3)  mg/dL


 


AST   18   (14-36)  U/L


 


ALT   13   (0-35)  U/L


 


Alkaline Phosphatase   164 H   ()  U/L


 


Troponin I     (0.000-0.034)  ng/mL


 


Serum Total Protein   6.5   (6.3-8.2)  g/dL


 


Albumin   3.9   (3.5-5.0)  g/dL














  07/29/18 Range/Units





  14:30 


 


WBC   (4.0-10.5)  K/mm3


 


RBC   (4.1-5.4)  M/mm3


 


Hgb   (12.0-16.0)  gm/dl


 


Hct   (35-47)  %


 


MCV   ()  fl


 


MCH   (26-32)  pg


 


MCHC   (32-36)  g/dl


 


RDW   (11.5-14.0)  %


 


Plt Count   (150-450)  K/mm3


 


MPV   (6-9.5)  fl


 


Gran %   (36.0-66.0)  %


 


Eos # (Auto)   (0-0.5)  


 


Absolute Lymphs (auto)   (1.0-4.6)  


 


Absolute Monos (auto)   (0.0-1.3)  


 


Lymphocytes %   (24.0-44.0)  %


 


Monocytes %   (0.0-12.0)  %


 


Eosinophils %   (0.00-5.0)  %


 


Basophils %   (0.0-0.4)  %


 


Absolute Granulocytes   (1.4-6.9)  


 


Basophils #   (0-0.4)  


 


PT   (9.95-12.35)  SECONDS


 


INR   (0.8-3.0)  


 


Sodium   (137-145)  mmol/L


 


Potassium   (3.5-5.1)  mmol/L


 


Chloride   ()  mmol/L


 


Carbon Dioxide   (22-30)  mmol/L


 


Anion Gap   (5-15)  MEQ/L


 


BUN   (7-17)  mg/dL


 


Creatinine   (0.52-1.04)  mg/dL


 


Estimated GFR   ML/MIN


 


Glucose   ()  mg/dL


 


Calcium   (8.4-10.2)  mg/dL


 


Total Bilirubin   (0.2-1.3)  mg/dL


 


AST   (14-36)  U/L


 


ALT   (0-35)  U/L


 


Alkaline Phosphatase   ()  U/L


 


Troponin I  < 0.012  (0.000-0.034)  ng/mL


 


Serum Total Protein   (6.3-8.2)  g/dL


 


Albumin   (3.5-5.0)  g/dL














- Radiology Exams


Ordered Rad Exams-Entire Visit: 


 Radiology Procedures











 Category Date Time Status


 


 CAROTID BILATERAL [US] Routine Exams  07/30/18 07:00 Completed


 


 CHEST 1 VIEW (PORTABLE) Stat Exams  07/29/18 16:02 Completed


 


 ECHO W/2D AND DOPPLER [US] Routine Exams  07/30/18 07:00 Taken


 


 HEAD WITHOUT CONTRAST [CT] Stat Exams  07/29/18 15:04 Completed


 


 MRI BRAIN W/O CONTRAST [MRI] Routine Exams  07/30/18 07:00 Completed














- Procedures and Test


Procedures and Tests throughout Hospitalization: 


 Therapy Orders & Screens





07/29/18 21:06


RT Screen per Nursing Assess ONCE 


   Comment: Protocol Order


   Physician Instructions: Greater than 3 points order RT Admission Screen


   Reason For Exam: Triggered on Admission


   Diagnosis: TRANSIENT ISCHEMIC ATTACK


   Diagnosis: TRANSIENT ISCHEMIC ATTACK


   Pneumonia: No


   Home O2: No


   Asthma: Yes


   CHF: No


   Home CPAP/BIPAP: Yes


   Home Nebs/MDI: No


   Total Points: 9





07/29/18 21:50


Oxygen NASAL CANNULA 2 lpm 


   Comment: 


   Diagnosis: TRANSIENT ISCHEMIC ATTACK





07/30/18 08:00


OT Eval and Treat (MD Order) ROUTINE 


   Comment: 


   Consulting Provider: 


   Physician Instructions: 


   Reason For Exam: 


   Diagnosis: TRANSIENT ISCHEMIC ATTACK


PT Eval & Treat (MD Order) ROUTINE 


   Reason for Eval:: TIA, Dr Order


   Diagnosis: TRANSIENT ISCHEMIC ATTACK


Speech Therapy Eval & Treat [ST Eval & Treat (MD Order)] .as ordered 


   Comment: 


   Physician Instructions: 


   Reason For Exam: 


   Evaluate: Yes


   Treat: Yes


   Reason for Eval: TIA, Dr Order


   Diagnosis: TRANSIENT ISCHEMIC ATTACK














- Discharge


Discharge Date: 07/30/18


Disposition: Home, Self-Care


Condition: Stable


Prescriptions: 


Continue


   Nebivolol HCl 5 MG*** [Bystolic 5 MG***] 5 mg PO DAILY


   Ranolazine 500 MG*** [Ranexa 500 MG***] 1,000 mg PO BID


   PANTOPRAZOLE 40 mg Tablet*** [Protonix 40MG Tablet***] 40 mg PO QAM


   Tramadol HCl 50 mg*** [Ultram 50 mg***] 100 mg PO Q8H PRN PRN


     PRN Reason: Pain


   Lisinopril/Hydrochlorothiazide [Lisinopril-Hctz 20-12.5 mg Tab] 1 each PO 

DAILY


   Gabapentin [Neurontin] 600 mg PO TID


   Cyclobenzaprine HCl [Flexeril] 10 mg PO TIDPRN PRN


     PRN Reason: Muscle Spasms


   Atorvastatin Calcium [Lipitor] 40 mg PO QHS


   Fish Oil/Borage/Flax/Om3,6,9 1 [Flaxseed-Fish-Borage Oil Sftgl] 400 mg PO QHS


   Cholecalciferol (Vitamin D3) [Vitamin D3] 2,000 unit PO QHS


   Naproxen Sodium 220 mg*** [Aleve 220 MG***] 440 mg PO QHS PRN


     PRN Reason: Pain


Follow up with: 


DOCTOR,NO FAMILY [Primary Care Provider] - 1 Week

## 2018-07-30 NOTE — XRAY
Indication: TIA.



Two-dimensional sonogram and color Doppler imaging of the carotid arteries of

the neck performed.



Comparison: None



Examination of the right carotid circulation demonstrates tortuous common

carotid artery.  Very minimal eccentric soft plaquing at the level of the

bulb.  PSV of the CCA is 64 cm/s.  PSV of the ICA is 119 cm/s.  ICA/CCA ratio

is 1.8.  Normal antegrade vertebral artery flow.



Examination of the left carotid circulation also demonstrates minimal

eccentric heterogeneous plaquing of the level of the bulb.  PSV of the CCA is

82 cm/s.  PSV of the ICA is 134 cm/s.  ICA/CCA ratio is 1.6.  Normal antegrade

vertebral artery flow.



Impression: Very minimal arteriosclerotic plaquing bilaterally as detailed.

Velocity measurements and ratios are negative for hemodynamically significant

flow-limiting stenosis.

## 2018-07-30 NOTE — XRAY
Indication: Facial/bilateral arm numbness.  Dizziness and blurred vision.

Difficulty communicating.



Sagittal, coronal, and axial MRI brain was performed without contrast using

T1, T2, FLAIR, diffusion, and ADC sequences.



Comparison: None



Ventriculosulcal pattern appears symmetric.  Age-appropriate global atrophy.

No acute intracranial hemorrhage, abnormal extra-axial fluid collection, or

mass effect.  Diffusion images are negative for restricted signal.  Fourth

ventricle is midline without hydrocephalus.  7/8 cranial nerve complex

bilaterally symmetric.  Normal flow-void signal within the major intracerebral

circulation.  Anatomic variant for empty sella.  Normal appearing

craniocervical junction.  Paranasal sinuses are clear.



Impression: Negative MRI brain without contrast exam.